# Patient Record
Sex: FEMALE | Race: WHITE | NOT HISPANIC OR LATINO | Employment: FULL TIME | URBAN - METROPOLITAN AREA
[De-identification: names, ages, dates, MRNs, and addresses within clinical notes are randomized per-mention and may not be internally consistent; named-entity substitution may affect disease eponyms.]

---

## 2024-03-31 ENCOUNTER — HOSPITAL ENCOUNTER (EMERGENCY)
Facility: CLINIC | Age: 34
Discharge: HOME | End: 2024-04-01
Attending: EMERGENCY MEDICINE | Admitting: EMERGENCY MEDICINE
Payer: COMMERCIAL

## 2024-03-31 DIAGNOSIS — F41.9 ANXIETY: ICD-10-CM

## 2024-03-31 DIAGNOSIS — G47.00 INSOMNIA, UNSPECIFIED TYPE: ICD-10-CM

## 2024-03-31 PROBLEM — F43.21 ADJUSTMENT DISORDER WITH DEPRESSED MOOD: Status: ACTIVE | Noted: 2024-03-31

## 2024-03-31 PROBLEM — F41.1 GENERALIZED ANXIETY DISORDER: Status: ACTIVE | Noted: 2024-03-31

## 2024-03-31 PROCEDURE — 250N000013 HC RX MED GY IP 250 OP 250 PS 637: Performed by: EMERGENCY MEDICINE

## 2024-03-31 PROCEDURE — 99283 EMERGENCY DEPT VISIT LOW MDM: CPT

## 2024-03-31 RX ORDER — ALBUTEROL SULFATE 90 UG/1
2 AEROSOL, METERED RESPIRATORY (INHALATION) EVERY 6 HOURS PRN
COMMUNITY

## 2024-03-31 RX ORDER — LORAZEPAM 2 MG/1
2 TABLET ORAL ONCE
Status: COMPLETED | OUTPATIENT
Start: 2024-03-31 | End: 2024-03-31

## 2024-03-31 RX ADMIN — LORAZEPAM 2 MG: 2 TABLET ORAL at 20:51

## 2024-03-31 ASSESSMENT — ACTIVITIES OF DAILY LIVING (ADL)
ADLS_ACUITY_SCORE: 35

## 2024-03-31 ASSESSMENT — COLUMBIA-SUICIDE SEVERITY RATING SCALE - C-SSRS
2. HAVE YOU ACTUALLY HAD ANY THOUGHTS OF KILLING YOURSELF IN THE PAST MONTH?: NO
6. HAVE YOU EVER DONE ANYTHING, STARTED TO DO ANYTHING, OR PREPARED TO DO ANYTHING TO END YOUR LIFE?: NO
1. IN THE PAST MONTH, HAVE YOU WISHED YOU WERE DEAD OR WISHED YOU COULD GO TO SLEEP AND NOT WAKE UP?: NO

## 2024-04-01 VITALS
DIASTOLIC BLOOD PRESSURE: 102 MMHG | WEIGHT: 229 LBS | TEMPERATURE: 97.6 F | BODY MASS INDEX: 33.92 KG/M2 | HEART RATE: 102 BPM | OXYGEN SATURATION: 96 % | RESPIRATION RATE: 17 BRPM | HEIGHT: 69 IN | SYSTOLIC BLOOD PRESSURE: 154 MMHG

## 2024-04-01 ASSESSMENT — ACTIVITIES OF DAILY LIVING (ADL)
ADLS_ACUITY_SCORE: 35

## 2024-04-01 NOTE — ED PROVIDER NOTES
Assumed care of the patient from Dr. Bennett.  The patient has been evaluated by the mental health 's and is safe for discharge.  She received a dose of lorazepam in the emergency department and was able to sleep for several hours.  I approached patient approximately 3 AM to see if she was comfortable discharge and she states that she will be comfortable with discharge however she has a long drive and would like to sleep a bit more prior to getting behind the wheel.  I think this is reasonable.  Should be discharged in the morning.     Trigger, Stephen Desai MD  04/01/24 2180

## 2024-04-01 NOTE — DISCHARGE INSTRUCTIONS
Recommendations:  -Continue to attend therapy two times a week if possible  -Follow up with psychiatrist as soon as possible  -Consider [facility] Meetings: [email or web address]    ________________________  Coping Skills;  -I will attend scheduled mental health therapy and psychiatric appointments and follow all recommendations      -I will commit to 30 minutes of self care daily - this can be as simple as taking a shower, going for a walk, cooking a meal, reading, writing, watching something funny, cleaning your home, or evening looking up affirmations.     -I will practice square breathing when I begin to feel anxious - in breath through the nose for the count of 4 and the first line on the square. Out breath through the mouth for the count of 4 for the second line of the square. Repeat to complete the square. Repeat the square as many times as needed.     - I will use distraction skills of: going for walks, watching TV, spending time outside, calling a friend or family member, creating a playlist, write a hand written letter to a loved one, or listening to a pod cast.      -Download a meditation shruthi and spend 15-20 minutes per day mediating/relaxing. Some apps to download include: Calm, Headspace and Insight Timer. All 3 of these apps have free version     Grounding Techniques:      Try to notice where you are, your surroundings including the people, the sounds like the TV or radio.      Concentrate on your breathing. Take a deep cleansing breath from your diaphragm. Count the breaths as you exhale. Make sure you breath slowly.      Hold something that you find comforting, for some it may be a stuffed animal or a blanket. Notice how it feels in your hands. Is it hard or soft?      During a non-crisis time make a list of positive affirmations. Print them out and keep them handy for times of intense anxiety. At those times, read them aloud.     Try the [place] game:      Name 5 things you can see in  the room with you      Name 4 things you can feel ( clothing on your back  or   fan on your skin )       Name 3 things you can hear right now ( people talking ,  birds  or  tv )       Name 2 things you can smell right now (or, 2 things you like the smell of)       Name 1 good thing about yourself     Create A Safe Place      Image a safe place -- it can be a real or imaginary place:       What do you see -- especially colors?       What sounds do you hear?       What sensations do you feel?       What smells do you smell?       What people or animals would you want in your safe place?       Imagine a protective bubble, wall or boundary around your safe place.       Imagine a door or gate with a guard at your safe place.       Image a lock and key to your safe place and only you can unlock it.      You can draw or make a collage that represents your safe place.       Choose a souvenir of your safe place -- a color, an object, a song.       Keep your image of your safe place so you can come back to it when you need to.      Reduce Extreme Emotion  QUICKLY:  Changing Your Body Chemistry       Change your body Temperature to change your autonomic nervous system       Use Ice Water to calm yourself down FAST       Splash ice water on your face, or hold an ice pack on your face      Intensely exercise to calm down a body revved up by emotion       Examples: running, walking fast, jumping, playing basketball, weight lifting, swimming, calisthenics, etc.       Engage in exercises that DO NOT include violent behaviors. Exercises that utilize violent behaviors tend to function as  behavioral rehearsal,  and rather than calming the person down, may actually  rev  the person up more, increasing the likelihood of violence, and lessening the likelihood that they will  burn off  energy      Progressively relax your muscles       Starting with your hands, moving to your forearms, upper arms, shoulders, neck, forehead, eyes, cheeks  "and lips, tongue and teeth, chest, upper back, stomach, buttocks, thighs, calves, ankles, feet       Tense (10 seconds,   of the way), then relax each muscle (all the way)       Notice the tension       Notice the difference when relaxed (by tensing first, and then relaxing, you are able to get a more thorough relaxation than by simply relaxing)       Paced breathing to relax       The standard technique is to begin with counting the number of steps one takes for a typical inhale, then counting the steps one takes for a typical exhale, and then lengthening the amount of steps for the exhalation by one or two steps.  OR      Repeat this pattern for 1-2 minutes      Inhale for four (4) seconds       Exhale for six (6) to eight (8) seconds       Research demonstrated that one can change one's overall level of anxiety by doing this exercise for even a few minutes per day     Practice Urge Surfing      This is a mindfulness technique that can be used to help reduce impulsive behaviors. Take several big deep breaths and \"ride the wave\" before you act upon negative thoughts or strong reactions.      1. Identify the Physical Sensation in the Body. Stop for a few minutes and be mindful of your physical responses to your urge. You can close your eyes ...  2. Focus on the Sensations. 3. Notice Breathing. 4. Refocus on Your Body. 5 Stay Curious and Present.       Sensations      Squeeze a rubber ball very hard. Listen to very loud music. Hold ice in your hand or mouth. Pay with sensory items. Go out in the rain or snow. Take a hot or cold shower.  Place an ice pack or a warm cloth on your forehead. Remember that you can use your 5 senses as helpful self-soothing techniques.     DBT Skills:    The ABC PLEASE skill is about taking good care of ourselves so that we can take care of others. Also, an important component of DBT is to reduce our vulnerability. When we take good care of ourselves, we are less likely to be vulnerable to " disease and emotional crisis.        ABC      A- Accumulate positive emotions by doing things that are pleasant.      B- Build mastery by doing things we enjoy. Whether it is reading, cooking, cleaning, fixing a car, working a cross word puzzle, or playing a musical instrument. Practice these things to  and in time we feel competent.      C- [place] by rehearsing a plan ahead of time so that we can be prepared to cope skillfully. (Think of what makes situations difficult, and what helps in those situations)         PLEASE      Treat Physical Illness and take medications as prescribed.      Balance eating in order to avoid mood swings.      Avoid mood-Altering substances and have mood control.      Maintain good sleep so you can enjoy your life.      Get exercise to maintain high spirits.    _________________________  Safety Recommendations:  -Come back to the Emergency Department with any new or worsening symptoms     -Use community resources, including hotline numbers, [facility] crisis and support meetings     -Maintain a daily schedule/routine     -Practice deep breathing skills     -Disclose my urges to people I trust, such as:  God Mother, Mother, Karol     -Abstain from all mood altering chemicals not currently prescribed to me      -Reduce caffeine intake (this can exacerbate anxiety and negatively impact sleep)    -Take medications as prescribed. Remove access to large amounts medications, have a pill ochoa for any prescribed medications.If appropriate, take medications with the help of loved ones to support daily compliance.       -Talk with family/ loved ones about your prodromal symptoms      -Reflect on symptoms before and after episodes with symptoms to gain insight into triggers and the need for additional care.      -Safety plan in the home, increase observation and check in often with family. Keep door open and be open to touching base with family as often as possible. Consider 24/7  support over the next several days to ensure safety and support.     -Remove any unsafe objects in the home like firearms or sharp objects. Discuss with family on going removal as needed to ensure safety.     -Follow up with out-patient recommended levels of care that were discussed today    -Remember, start where you are, use what you have and do what you can in regard to coping skills and services.     Discharge Instructions  Mental Health Concerns    You were seen today for mental health concerns, such as depression, anxiety, or suicidal thinking. Your provider feels that you do not require hospitalization at this time. However, your symptoms may become worse, and you may need to return to the Emergency Department. Most treatments of depression and suicidal thoughts are a process rather than a single intervention.  Medications and counseling can take several weeks or more to help.    Generally, every Emergency Department visit should have a follow-up clinic visit with either a primary or a specialty clinic/provider. Please follow-up as instructed by your emergency provider today.    By accepting these discharge instructions:  You promise to not harm yourself or others.  You agree that if you feel you are becoming unable to keep that promise, you will do something to help yourself before you do anything to harm yourself or others.   You agree to keep any safety plan arranged on your visit here today.  You agree to take any medication prescribed or recommended by your provider.  If you are getting worse, you can contact a friend or a family member, contact your counselor or family provider, contact a crisis line, or other options discussed with the provider or therapist today.  At any time, you can call 911 and return to the Emergency Department for more help.  You understand that follow-up is essential to your treatment, and you will make and keep appointments recommended on your visit today.    How to improve  your mental health and prevent suicide:  Involve others by letting family, friends, counselors know.  Do not isolate yourself.  Avoid alcohol or drugs. Remove weapons, poisons from your home.  Try to stick to routines for eating, sleeping and getting regular exercise.    Try to get into sunlight. Bright natural light not only treats seasonal affective disorder but also depression.  Increase safe activities that you enjoy.    If you feel worse, contact [phone] ([phone]), or call 911, or your primary provider/counselor for additional assistance.    If you were given a prescription for medicine here today, be sure to read all of the information (including the package insert) that comes with your prescription.  This will include important information about the medicine, its side effects, and any warnings that you need to know about.  The pharmacist who fills the prescription can provide more information and answer questions you may have about the medicine.  If you have questions or concerns that the pharmacist cannot address, please call or return to the Emergency Department.   Remember that you can always come back to the Emergency Department if you are not able to see your regular provider in the amount of time listed above, if you get any new symptoms, or if there is anything that worries you.

## 2024-04-01 NOTE — CONSULTS
Diagnostic Evaluation Consultation  Crisis Assessment    Patient Name: Hazel Sanchez  Age:  33 year old  Legal Sex: female  Gender Identity: female  Pronouns:   Race: Data Unavailable  Ethnicity: Data Unavailable  Language: Data Unavailable      Patient was assessed: Virtual: [place] Crisis Assessment Start Time: 2111 (9:11PM-9:43PM) Crisis Assessment Stop Time: 2218 (9:58PM-10:10PM, 10:16PM-10:18PM)  Patient location: [facility] EMERGENCY DEPT                             ED21    Referral Data and Chief Complaint  Hazel Sanchez presents to the ED by  self. Patient is presenting to the ED for the following concerns: Anxiety, Depression, Worsening psychosocial stress.   Factors that make the mental health crisis life threatening or complex are:  Patient reported that she drove herself to the [facility] today due to concerns with severe anxiety and ongoing insomnia.  Patient shared back story of her current psychosocial stressors.  Patient expressed that her  was recently at [facility] and then is now currently attending [facility] in [place].  Patient shared that her  has had historical concerns with alcohol use disorder and had recent attempt with suicide prior to getting care at [facility].  Patient expressed that the ongoing dysregulation within the household and her 's work with his own recovery has been very stressful.  Patient related that she has been taking care of the kids on her own while working full-time which can be very overwhelming.  Patient stated that over the past week she has had numerous panic attacks, stating a couple times a day, lasting 20 to 30 minutes at a time.  Patient stated her current trigger is her relationship with her  noting that he has been calling throughout the week often placing blame that he is currently in treatment and has requested a divorce as well.  Patient stated  that this has been very dysregulating for her and anxiety provoking.  Patient stated that she has had worsening symptoms of headaches, unable to eat, many physical related symptoms of tightness in chest, jittery and restlessness.  Patient stated today that her anxiety was a 10 out of 10.  Patient noted that she has not been able to sleep in several days and the thought of an another sleepless night was worsening her anxiety.  Patient stated that she is here today to seek out assistance with her sleep and anxiety.  At the time of encounter patient denied any thoughts about harming self or others.  Patient denied self injurious behaviors.  Patient denied psychosis and manic symptoms.  At the time of encounter patient was calm and cooperative, patient did not appear to be in any distress.  Patient did report that she was given medications while in the emergency department which was very helpful stating that this alleviated most of her symptoms.  During encounter discussion of admission to [facility] patient unit versus discharge was had..      Informed Consent and Assessment Methods  Explained the crisis assessment process, including applicable information disclosures and limits to confidentiality, assessed understanding of the process, and obtained consent to proceed with the assessment.  Assessment methods included conducting a formal interview with patient, review of medical records, collaboration with medical staff, and obtaining relevant collateral information from family and community providers when available.  : done     Patient response to interventions: acceptance expressed, verbalizes understanding  Coping skills were attempted to reduce the crisis:  Pt has been attempting to take her medications, attend therapy and speak with family to regulate her anxiety.     History of the Crisis   Pt stated that 2 weeks ago she began individual therapy due to her worsening anxiety symptoms causing some depression symptoms.  Pt relates most current symptoms due to the conflict within her relationship and on going stressors within her family household.  pt noted a few weeks ago, she only related to panic attacks once a week. Pt stated that the first week of therapy she attended twice.Last week she attended 1 time. patient stated she plans to attend this week as well.  Pt noted that she also recently started medications for mental health. patient stated that she is currently prescribed sertraline and Xanax.  Patient noted that her psychiatrist said that she should only take 1 Xanax per day.  Patient noted that the Xanax is helpful though only last 3 to 4 hours at a time.  Patient stated that historically she has taken sertraline due to postpartum anxiety which she had after the birth of her second child 3 years ago.  Patient stated that she was also prescribed a sleeping medication recently though took it for 2 days and found that it actually worsened her anxiety and her inability to sleep.  Patient notes she spoke with her psychiatrist who told her that she could stop this medication and stated that the new medication was prescribed on Friday.  Patient noted that she attempted to  the medications though due to the pharmacy unable to verify the medications with the provider over the weekend she was unable to  the medication and begin.  Patient denies any past attempts on life.  Patient denies any past self-injurious behavior.  Patient reported no past history of psychosis concerns.  Pt reports historical diagnosis as KATARINA. Chart review shows pt recently was given a PTSD dx, though pt did not report this to this [facility]. Pt notes that current depression symptoms are exacerbated by current situation and anxiety symptoms, noting them as minimal compared to anxiety. Pt denies any past or current SI, SIB or HI concerns.  No past history with substance use concerns. No past programamtic care or admissions for mental health.    Brief  "Psychosocial History  Family:  , Children yes  Support System:  Other (specify), Parent(s) (God Mother, Co workers, Good Friends, Mother. Sister in-law and Father)  Employment Status:  employed full-time  Source of Income:  salary/wages  Financial Environmental Concerns:  none  Current Hobbies:  reading, family functions, social media/computer activities (Reading, spending time with friends.)  Barriers in Personal Life:  mental health concerns    Significant Clinical History  Current Anxiety Symptoms:  panic attack, shortness of breath or racing heart, anxious, racing thoughts, excessive worry  Current Depression/Trauma:  sadness, hopelessness, helplessness, difficulty concentrating, sense of doom, crying or feels like crying, irritable, avoidance, low self esteem, excessive guilt  Current Somatic Symptoms:  anxious, shortness of breath or racing heart, excessive worry, racing thoughts, sweating, flushing, shaking, somatic symptoms (abdominal pain, headache, tension)  Current Psychosis/Thought Disturbance:     Current Eating Symptoms:  loss of appetite  Chemical Use History:  Alcohol: Social  Last Use:: 03/28/24 (\"two glasses of wine\" with friends, last time was a week ago. Social use only .)  Benzodiazepines: None  Cocaine: None  Marijuana: None  Other Use: None  Withdrawal Symptoms: Tremors (Denied)  Addictions:  (Denied)   Past diagnosis:  Anxiety Disorder, PTSD  Family history:  No known history of mental health or chemical health concerns  Past treatment:  Psychiatric Medication Management, Individual therapy, Primary Care  Details of most recent treatment:  Patient just started [facility] in [facility].  Patient stated that she is scheduled to be seen again tomorrow Monday, April 1 at 5 PM.  Patient noted that she recently started medications again and has been taking them over the past week and a half outside of her sleeping medication " which was discontinud a few days ago.   Patient notes she does take her medications every day.  Patient stated she is scheduled to see her psychiatrist again on April 11.  Other relevant history:  Pt currently lives in [place], [place] in [facility]. Pt typically lives with her  and two sons, ages 6 and 3. Pt is working fulltime as a teacher. Pt denies legal concerns. Pt noted numerous supports between her family, husbands family and her friends/ coworkers. Pt stated historically she enjoys reading and scrolling on tik tok. Pt also stated she likes to spend time with friends and family often.       Collateral Information  Is there collateral information: Yes     Collateral information name, relationship, phone number:  Phone [phone], Aunt, Valentine Funk    What happened today: Collateral got a call from pt's other aunt, who lives out of [facility], noting Hazel was having an emergency. Collateral called pt and pt stated she was going to [facility] ED. Pt was tearful when pt came to ED. Per aunt,  never seen her like this  stating pt is a very strong person. Aunt noted pt looked in distressed upon ED encounter.     What is different about patient's functioning: Pt has been having on going panic attacks, unable to sleep and is appearing very sad.     Concern about alcohol/drug use:  no    What do you think the patient needs:  sleep    Has patient made comments about wanting to kill themselves/others: no    If d/c is recommended, can they take part in safety/aftercare planning:  yes (Aunt will get a hotel with pt or sleep at pts home for extra support. Aunt will encourage pt to follow any recommendations. Aunt will check in on pt.)    Additional collateral information:  Aunt lives in [place], here in [place] visiting family. Pt s  is in treatment at this time for alcohol use. Aunt was unaware of recent concerns and stressors for pt until this weekend, when visiting. Collateral unaware of any past attempts on pts  life.     Risk Assessment  [place] Suicide Severity Rating Scale Full Clinical Version:  Suicidal Ideation  Q1 Wish to be Dead (Lifetime): No  Q2 Non-Specific Active Suicidal Thoughts (Lifetime): No  Q6 Suicide Behavior (Lifetime): no     Suicidal Behavior (Lifetime)  Actual Attempt (Lifetime): No  Has subject engaged in non-suicidal self-injurious behavior? (Lifetime): No  Interrupted Attempts (Lifetime): No  Aborted or Self-Interrupted Attempt (Lifetime): No  Preparatory Acts or Behavior (Lifetime): No    [place] Suicide Severity Rating Scale Recent:   Suicidal Ideation (Recent)  Q1 Wished to be Dead (Past Month): no  Q2 Suicidal Thoughts (Past Month): no  Level of Risk per Screen: no risks indicated  Intensity of Ideation (Recent)  Most Severe Ideation Rating (Past 1 Month):  (Denied)  Frequency (Past 1 Month):  (Denied)  Duration (Past 1 Month):  (Denied)  Controllability (Past 1 Month):  (Denied)  Deterrents (Past 1 Month): Does not apply (Denied)  Reasons for Ideation (Past 1 Month): Does not apply (Denied)  Suicidal Behavior (Recent)  Actual Attempt (Past 3 Months): No  Has subject engaged in non-suicidal self-injurious behavior? (Past 3 Months): No  Interrupted Attempts (Past 3 Months): No  Aborted or Self-Interrupted Attempt (Past 3 Months): No  Preparatory Acts or Behavior (Past 3 Months): No    Environmental or Psychosocial Events: challenging interpersonal relationships, helplessness/hopelessness, recent life events (see comment) ( is currently in treatment. Single parenting at this time.)  Protective Factors: Protective Factors: strong bond to family unit, community support, or employment, responsibilities and duties to others, including pets and children, lives in a responsibly safe and stable environment, good treatment engagement, supportive ongoing medical and mental health care relationships, sense of importance of health and wellness, able to access care without barriers, help seeking,  good impulse control, optimistic outlook - identification of future goals, reality testing ability, constructive use of leisure time, enjoyable activities, resilience, good problem-solving, coping, and conflict resolution skills    Does the patient have thoughts of harming others? Feels Like Hurting Others: no  Previous Attempt to Hurt Others: no  Current presentation:  (calm and cooperative)  Is the patient engaging in sexually inappropriate behavior?: no    Is the patient engaging in sexually inappropriate behavior?  no        Mental Status Exam   Affect: Appropriate  Appearance: Appropriate  Attention Span/Concentration: Attentive  Eye Contact: Engaged    Fund of Knowledge: Appropriate   Language /Speech Content: Fluent  Language /Speech Volume: Normal  Language /Speech Rate/Productions: Normal  Recent Memory: Intact  Remote Memory: Intact  Mood: Anxious, Sad, Normal  Orientation to Person: Yes   Orientation to Place: Yes  Orientation to Time of Day: Yes  Orientation to Date: Yes     Situation (Do they understand why they are here?): Yes  Psychomotor Behavior: Normal  Thought Content: Clear  Thought Form: Intact, Goal Directed          Medication  Psychotropic medications:   Medication Orders - Psychiatric (From admission, onward)      None             Current Care Team  Patient Care Team:  Faviola Souza as Licensed Mental Health Professional  Dr. Debby Lezama as Psychiatrist    Diagnosis  Patient Active Problem List   Diagnosis Code    Generalized anxiety disorder F41.1    Adjustment disorder with depressed mood F43.21       Primary Problem This Admission  Active Hospital Problems    *Generalized anxiety disorder      Adjustment disorder with depressed mood        Clinical Summary and Substantiation of Recommendations   Pt is a 33-year-old female with a past history of anxiety disorder who is presenting to the emergency department today due to worsening panic attacks,  anxiety exacerbating depression due to  situational stressors and insomnia.  At time of encounter patient is calm, cooperative and appears to be in no distress. Pt reports medications given in ED have been helpful in reducing anxiety symptoms.  Pt is reporting no safety concerns in regards to thoughts of harming self or others.  Pt is heard to be very future orientated and is showing strong ability to safety plan.   Pt appears to be very insightful into her needs and triggers that are exacerbating her mental health symptoms. Patient's symptoms appear to be related due to ongoing psychosocial stressors within her relationship and home life.  Pt appears to have been attempting to address such concerns through her recent start in therapy and medications though due to debilitating anxiety and insomnia has found little to no relief with outpatient services at this time.  Discussion between empath unit versus discharge was had tonight with patient.  Patient was educated that the [facility] unit was currently closed due to staffing though patient could board in the emergency department overnight [facility] psychiatrist and therapist.  Patient was offered such level of care though denied. Pt stated if patient is able to obtain medications for sleep from the emergency department provider, patient noted she would feel safe to return into the care of her godmother and make a follow-up call with her psychiatrist next day and attend her therapy appointment at 5 PM on Monday.  Licensed mental health professional reviewed safety plan and resources with pt.  Safety planning was also done with patient's godmother who is present in the emergency department to ensure safe and supportive when pt discharges.  At time of encounter patient does not appear holdable and does not currently meet criteria for admission for mental health. It should be noted clearly that during patient's encounter in the hospital she was thankfully able to fall  asleep.  Rather than wake patient up, it appears at this time patient will be sleeping in the emergency department overnight with plan for discharge in the morning.  However should pt report that she does not feel safe or ready to discharge, consideration for transfer to the empath unit for further observation and therapeutic interventions as recommended.      Patient coping skills attempted to reduce the crisis:  Pt has been attempting to take her medications, attend therapy and speak with family to regulate her anxiety.    Disposition  Recommended disposition: Individual Therapy, Medication Management        Reviewed case and recommendations with attending provider. Attending Name: Abdias Bennett MD       Attending concurs with disposition: yes (MD reported that pt fell asleep in the ED. MD noted that discharge would be appropriate should patient feel comfortable in the morning and acknowledged safety plan and AVS.MD noted that pt would be given option to go to empath for services in the morning)       Patient and/or validated legal guardian concurs with disposition:   yes (Pt stated if able to obtain  medications for sleep and obtain sleep ,would be comfortable with discharge.)       Final disposition:  discharge    Legal status on admission: Voluntary/Patient has signed consent for treatment    Assessment Details   Total duration spent with the patient: 46 min     CPT code(s) utilized: 02650 - Psychotherapy for Crisis - 60 (30-74*) min    JUMA Almazan, Psychotherapist  DEC - Triage & Transition Services  Callback: [phone]

## 2024-04-01 NOTE — ED NOTES
Plan for patient to discharge home after speaking with MD about sleeping medication. Staff will call [facility]) [phone] if patient decides she wants to go to [facility] in the morning instead of discharging home. Patient will go home with her godmother, and has a safety plan in place.    Pt needs to sign and date paperwork that has been faxed to the ED, and then [facility]

## 2024-04-01 NOTE — ED PROVIDER NOTES
Sign Out Note    Pt accepted in sign out from: Dr. Lawrence Jaramillo pt presented to the ED for: anxiety    Plan at time of sign out: Patient was sleeping, patient to be discharged when she feels well rested and safe driving home.    Care of patient during my shift: No issues.  I had a face-to-face with the patient at 6:45 AM.  She reports sleeping well and feels safe and desires to go home.  Chart review shows that DEC consult has been placed and performed.  Patient desired a recommendation for a medication to help her sleep at night.  Discussed over-the-counter options and recommended doxylamine and to follow-up with PCP if not effective.  She contracted for safety and feels safe and desires to go home.  No additional concerns.  Offered food and water.    Plan for patient at this time: Discharged home     Andre Sanchez,   04/01/24 0688

## 2024-04-01 NOTE — PLAN OF CARE
Hazel Clinton Leobardo Daniel  April 1, 2024  Plan of Care Hand-off Note     Patient Care Path: discharge (In morning)    Plan for Care:   Pt is a 33-year-old female with a past history of anxiety disorder who is presenting to the emergency department today due to worsening panic attacks,  anxiety exacerbating depression due to situational stressors and insomnia.  At time of encounter patient is calm, cooperative and appears to be in no distress. Pt reports medications given in ED have been helpful in reducing anxiety symptoms.  Pt is reporting no safety concerns in regards to thoughts of harming self or others.  Pt is heard to be very future orientated and is showing strong ability to safety plan.   Pt appears to be very insightful into her needs and triggers that are exacerbating her mental health symptoms. Patient's symptoms appear to be related due to ongoing psychosocial stressors within her relationship and home life.  Pt appears to have been attempting to address such concerns through her recent start in therapy and medications though due to debilitating anxiety and insomnia has found little to no relief with outpatient services at this time.  Discussion between empath unit versus discharge was had tonight with patient.  Patient was educated that the [facility] unit was currently closed due to staffing though patient could board in the emergency department overnight with a transfer to [facility] in the morning to meet with psychiatrist and therapist.  Patient was offered such level of care though denied. Pt stated if patient is able to obtain medications for sleep from the emergency department provider, patient noted she would feel safe to return into the care of her godmother and make a follow-up call with her psychiatrist next day and attend her therapy appointment at 5 PM on Monday.  Licensed mental health professional reviewed safety plan and resources with pt.  Safety planning was also done with patient's godmother  who is present in the emergency department to ensure safe and supportive when pt discharges.  At time of encounter patient does not appear holdable and does not currently meet criteria for admission for mental health. It should be noted clearly that during patient's encounter in the hospital she was thankfully able to fall asleep.  Rather than wake patient up, it appears at this time patient will be sleeping in the emergency department overnight with plan for discharge in the morning.  However should pt report that she does not feel safe or ready to discharge, consideration for transfer to the empath unit for further observation and therapeutic interventions as recommended.    Identified Goals and Safety Issues: Goals: Medications for sleep, obtain sleep, discharge into care of family. Safety: Panic attacks, triggered by relationship with  in his recovery, ongoing insomnia and inability to sleep    Overview:  Phone [phone], Aunt, Valentine Funk      Legal Status: Legal Status at Admission: Voluntary/Patient has signed consent for treatment    Psychiatry Consult: N/A       Updated RN/MD  regarding plan of care.           JUMA Almazan

## 2024-04-01 NOTE — ED TRIAGE NOTES
Pt reports increased stress over the past 3-4 days. Pt notes since January her  has gone to treatment making the stress and anxiety increase. Pt notes having to take care of their children alone.      Triage Assessment (Adult)       Row Name 03/31/24 2026          Triage Assessment    Airway WDL WDL        Respiratory WDL    Respiratory WDL WDL        Skin Circulation/Temperature WDL    Skin Circulation/Temperature WDL WDL        Cardiac WDL    Cardiac WDL X  Generalized CP        Peripheral/Neurovascular WDL    Peripheral Neurovascular WDL WDL        Cognitive/Neuro/Behavioral WDL    Cognitive/Neuro/Behavioral WDL X  lightheaded

## 2024-04-01 NOTE — ED PROVIDER NOTES
"  History     Chief Complaint:  Anxiety       HPI   Hazel Sanchez is a 33 year old female who presents with aunt for evaluation of anxiety. The patient states she has a lot of stressors in her life recently. She reports her  being an alcoholic and experiencing a mental breakdown in January. He later tried to commit suicide but is currently receiving help. As a result the patient needs to take care of her  and their kids alone. She reports being very anxious for the past couple of dates, the nights being the hardest. She states her therapist recently diagnosed her with PTSD. She reports going to [facility] in [place] and having a prescriber there. She is taking xanax Lunesta, sertraline, and temazepam. Denies fever, cough, vomiting, diarrhea, and suicidal ideation.        Independent Historian:   None - Patient Only      Medications:    albuterol (PROAIR HFA/PROVENTIL HFA/VENTOLIN HFA) 108 (90 Base) MCG/ACT inhaler  sertraline (ZOLOFT) 50 MG tablet        Past Medical History:    No past medical history on file.    Past Surgical History:    No past surgical history on file.     Physical Exam   Patient Vitals for the past 24 hrs:   BP Temp Temp src Pulse Resp SpO2 Height Weight   03/31/24 2029 (!) 167/113 97.6  F (36.4  C) Temporal 102 20 99 % 1.753 m (5' 9\") 103.9 kg (229 lb)        Physical Exam  VS: Reviewed per above  HENT: Mucous membranes moist  EYES: sclera anicteric  CV: Rate as noted,  regular rhythm.   RESP: Effort normal. Breath sounds are normal bilaterally.  PSYCH: denies SI, +insomnia and anxiety  NEURO: Alert, moving all extremities  MSK: No deformity of the extremities  SKIN: Warm and dry      Emergency Department Course       Emergency Department Course & Assessments:    Interventions:  Medications   LORazepam (ATIVAN) tablet 2 mg (2 mg Oral $Given 3/31/24 2051)        Consultations/Discussion of Management or Tests:  DEC    Disposition:  Care of " the patient was transferred to my colleague pending transfer to [facility] in AM vs discharge home if feeling better.     Impression & Plan        Medical Decision Making:  Patient presents to the ER for evaluation of debilitating anxiety and insomnia.  Vital signs umbel for low-grade hypertension.  She has no acute medical concerns.  Patient does not appear to be a threat to her own safety but certainly is distressed by her symptoms.  She was given oral Ativan for anxiolysis.  DEC evaluated the patient.  Plan for either transfer to [facility] unit in the a.m. when they have capacity versus discharge home if patient is feeling better at that time.  Patient was signed out to my colleague at shift change.      Diagnosis:    ICD-10-CM    1. Anxiety  F41.9       2. Insomnia, unspecified type  G47.00            Discharge Medications:  New Prescriptions    No medications on file         Abdias Bennett MD  04/01/24 0104